# Patient Record
Sex: MALE | Race: WHITE | NOT HISPANIC OR LATINO | URBAN - METROPOLITAN AREA
[De-identification: names, ages, dates, MRNs, and addresses within clinical notes are randomized per-mention and may not be internally consistent; named-entity substitution may affect disease eponyms.]

---

## 2017-05-18 ENCOUNTER — IMPORTED ENCOUNTER (OUTPATIENT)
Dept: URBAN - METROPOLITAN AREA CLINIC 1 | Facility: CLINIC | Age: 82
End: 2017-05-18

## 2017-05-18 PROBLEM — Z79.84: Noted: 2017-05-18

## 2017-05-18 PROBLEM — H43.813: Noted: 2017-05-18

## 2017-05-18 PROBLEM — E11.9: Noted: 2017-05-18

## 2017-05-18 PROBLEM — H35.033: Noted: 2017-05-18

## 2017-05-18 PROBLEM — D31.32: Noted: 2017-05-18

## 2017-05-18 PROCEDURE — 92014 COMPRE OPH EXAM EST PT 1/>: CPT

## 2017-05-18 NOTE — PATIENT DISCUSSION
1.  DM Type II (Oral Meds) without sign of diabetic retinopathy and no blot heme on dilated retinal examination today OU No Macular Edema:  Discussed the pathophysiology of diabetes and its effect on the eye and risk of blindness. Stressed the importance of strong glucose control. Advised of importance of at least yearly dilated examinations but to contact us immediately for any problems or concerns. 2. PVD OU - RD precautions. 3.  Hypertensive Retinopathy OU- Stable continue HTN Control4. Choroidal Nevus OS: Appears Benign and stable. Observe for changes. Letter to [address] for an appointment in 1 year 27 with Dr. Chasity Escalona.

## 2018-05-18 ENCOUNTER — IMPORTED ENCOUNTER (OUTPATIENT)
Dept: URBAN - METROPOLITAN AREA CLINIC 1 | Facility: CLINIC | Age: 83
End: 2018-05-18

## 2018-05-18 PROBLEM — R73.09: Noted: 2018-05-18

## 2018-05-18 PROBLEM — D31.32: Noted: 2018-05-18

## 2018-05-18 PROCEDURE — 92014 COMPRE OPH EXAM EST PT 1/>: CPT

## 2018-05-18 NOTE — PATIENT DISCUSSION
1.  DM Type II (Diet Controlled) without sign of diabetic retinopathy and no blot heme on dilated retinal examination today OU No Macular Edema:  Discussed the pathophysiology of diabetes and its effect on the eye and risk of blindness. Stressed the importance of strong glucose control. Advised of importance of at least yearly dilated examinations but to contact us immediately for any problems or concerns. 2. Choroidal Nevus OS: Appears Benign and stable. Observe for changes. 3. PVD OU - RD precautions. 4.  Hypertensive Retinopathy OU- Stable continue HTN Control5. Nasal mass @ Lateral [place] post Biopsy by Dermatology; Suspicious for malignancy- Pt has f/u with Dermatology to review findings. Letter to PCP Return for an appointment in 1 yr 27 with Dr. Shira Etienne.

## 2019-05-17 ENCOUNTER — IMPORTED ENCOUNTER (OUTPATIENT)
Dept: URBAN - METROPOLITAN AREA CLINIC 1 | Facility: CLINIC | Age: 84
End: 2019-05-17

## 2019-05-17 PROBLEM — H35.033: Noted: 2019-05-17

## 2019-05-17 PROBLEM — E11.9: Noted: 2019-05-17

## 2019-05-17 PROBLEM — Z79.84: Noted: 2019-05-17

## 2019-05-17 PROBLEM — D31.32: Noted: 2019-05-17

## 2019-05-17 PROBLEM — Z96.1: Noted: 2019-05-17

## 2019-05-17 PROBLEM — H02.105: Noted: 2019-05-17

## 2019-05-17 PROBLEM — H43.813: Noted: 2019-05-17

## 2019-05-17 PROCEDURE — 92014 COMPRE OPH EXAM EST PT 1/>: CPT

## 2019-05-17 NOTE — PATIENT DISCUSSION
1.  DM Type II (Oral Medication) without sign of diabetic retinopathy and no blot heme on dilated retinal examination today OU No Macular Edema:  Discussed the pathophysiology of diabetes and its effect on the eye and risk of blindness. Stressed the importance of strong glucose control. Advised of importance of at least yearly dilated examinations but to contact us immediately for any problems or concerns. 2. Choroidal Nevus OS: Appears Benign and stable. Observe for changes. 3. Pseudophakia OU - (Standard OU) 4.  Ectropion LLL - from prior excision site for [facility] x2. F/u w/ Dr. Martin Home as scheduled for Ectropion treatment5. GR I Hypertensive Retinopathy OU- Stable continue HTN Control6. PVD OU - RD precautions. Patient deferred Manifest Rx today. Return for an appointment in 1 year 27 with Dr. Liss Leiva.

## 2019-05-17 NOTE — PATIENT DISCUSSION
Ectropion LLL - from prior excision site for [facility] x2.  F/u w/ Dr. Richard Campoverde as scheduled for Ectropion treatment

## 2020-05-18 ENCOUNTER — IMPORTED ENCOUNTER (OUTPATIENT)
Dept: URBAN - METROPOLITAN AREA CLINIC 1 | Facility: CLINIC | Age: 85
End: 2020-05-18

## 2020-05-18 PROBLEM — Z96.1: Noted: 2020-05-18

## 2020-05-18 PROBLEM — D31.32: Noted: 2020-05-18

## 2020-05-18 PROBLEM — E11.9: Noted: 2020-05-18

## 2020-05-18 PROBLEM — Z79.84: Noted: 2020-05-18

## 2020-05-18 PROCEDURE — 92014 COMPRE OPH EXAM EST PT 1/>: CPT

## 2020-05-18 NOTE — PATIENT DISCUSSION
Theo Marshall MD   You 26 minutes ago (11:00 AM)                 The person on inpatient service can do it. (Routing comment)                  Type II diabetes controlled by oral medications.

## 2020-05-18 NOTE — PATIENT DISCUSSION
1.  DM Type II (Oral Medication) without sign of diabetic retinopathy and no blot heme on dilated retinal examination today OU No Macular Edema - Discussed the pathophysiology of diabetes and its effect on the eye and risk of blindness. Stressed the importance of strong glucose control. Advised of importance of at least yearly dilated examinations but to contact us immediately for any problems or concerns. 2. Choroidal Nevus OS -- Appears Benign and stable. Observe for changes. 3. Pseudophakia OU (Standard OU) 4.  Ectropion LLL -- from prior excision site for [facility] x2. F/u w/ Dr. Kofi Serrano as scheduled for Ectropion treatment5. GR I Hypertensive Retinopathy OU -- Stable continue HTN Control. 6. PVD OU -- RD precautions. Patient deferred Manifest Rx today. Return for an appointment in 1 year 27 with Dr. Eugenia Israel.

## 2021-05-18 ENCOUNTER — IMPORTED ENCOUNTER (OUTPATIENT)
Dept: URBAN - METROPOLITAN AREA CLINIC 1 | Facility: CLINIC | Age: 86
End: 2021-05-18

## 2021-05-18 PROBLEM — H43.813: Noted: 2021-05-18

## 2021-05-18 PROBLEM — Z79.84: Noted: 2021-05-18

## 2021-05-18 PROBLEM — H35.033: Noted: 2021-05-18

## 2021-05-18 PROBLEM — H04.123: Noted: 2021-05-18

## 2021-05-18 PROBLEM — D31.32: Noted: 2021-05-18

## 2021-05-18 PROBLEM — H16.143: Noted: 2021-05-18

## 2021-05-18 PROBLEM — E11.9: Noted: 2021-05-18

## 2021-05-18 PROCEDURE — 99214 OFFICE O/P EST MOD 30 MIN: CPT

## 2021-05-18 NOTE — PATIENT DISCUSSION
1.  DM Type II (Oral Medication) without sign of diabetic retinopathy and no blot heme on dilated retinal examination today OU No Macular Edema - Discussed the pathophysiology of diabetes and its effect on the eye and risk of blindness. Stressed the importance of strong glucose control. Advised of importance of at least yearly dilated examinations but to contact us immediately for any problems or concerns. 2. Choroidal Nevus OS -- Appears Benign and stable. Observe for changes. 3. MARLINE w/ PEK OU -- Recommend the routine use of OTC ATs QID OU routinely. 4. PCO OU -- observe. Consider YAG Cap when patient becomes symptomatic. 5.  Pseudophakia OU (Standard OU) 6.  Ectropion LLL -- from prior excision site for [facility] x2. F/u w/ Dr. Smith Delmar as scheduled for Ectropion treatment7. GR I Hypertensive Retinopathy OU -- Stable continue HTN Control. 8. PVD OU -- RD precautions. 9.  RLL Papilloma -- Benign. Observe. Patient deferred Manifest Rx today. Return for an appointment in 1 year 30/glare with Dr. Michelle King.

## 2022-04-02 ASSESSMENT — VISUAL ACUITY
OD_CC: 20/30
OS_CC: 20/30-1
OS_CC: 20/25
OS_CC: 20/25
OS_CC: 20/30
OD_CC: 20/25
OS_CC: 20/30
OD_CC: 20/20
OD_CC: 20/25
OD_CC: 20/25-1

## 2022-04-02 ASSESSMENT — TONOMETRY
OS_IOP_MMHG: 13
OS_IOP_MMHG: 13
OD_IOP_MMHG: 14
OS_IOP_MMHG: 14
OD_IOP_MMHG: 12
OD_IOP_MMHG: 13
OD_IOP_MMHG: 13
OS_IOP_MMHG: 13
OS_IOP_MMHG: 13
OD_IOP_MMHG: 13